# Patient Record
Sex: MALE | Race: WHITE
[De-identification: names, ages, dates, MRNs, and addresses within clinical notes are randomized per-mention and may not be internally consistent; named-entity substitution may affect disease eponyms.]

---

## 2018-07-07 ENCOUNTER — HOSPITAL ENCOUNTER (OUTPATIENT)
Dept: HOSPITAL 53 - M WUC | Age: 28
End: 2018-07-07
Attending: PHYSICIAN ASSISTANT
Payer: COMMERCIAL

## 2018-07-07 DIAGNOSIS — L04.0: Primary | ICD-10-CM

## 2018-07-07 LAB
BASO #: 0.1 10^3/UL (ref 0–0.2)
BASO %: 1.1 % (ref 0–1)
EOS #: 0.1 10^3/UL (ref 0–0.5)
EOSINOPHIL NFR BLD AUTO: 1.6 % (ref 0–3)
HEMATOCRIT: 48.4 % (ref 42–52)
HEMOGLOBIN: 16.5 G/DL (ref 13.5–17.5)
IMMATURE GRANULOCYTE %: 0.8 % (ref 0–3)
LYMPH #: 1.1 10^3/UL (ref 1.5–6.5)
LYMPH %: 17.6 % (ref 24–44)
MEAN CORPUSCULAR HEMOGLOBIN: 29.6 PG (ref 27–33)
MEAN CORPUSCULAR HGB CONC: 34.1 G/DL (ref 32–36.5)
MEAN CORPUSCULAR VOLUME: 86.9 FL (ref 80–96)
MONO #: 0.8 10^3/UL (ref 0–0.8)
MONO %: 12.9 % (ref 0–5)
NEUTROPHILS #: 4.2 10^3/UL (ref 1.8–7.7)
NEUTROPHILS %: 66 % (ref 36–66)
NRBC BLD AUTO-RTO: 0 % (ref 0–0)
PLATELET COUNT, AUTOMATED: 217 10^3/UL (ref 150–450)
RED BLOOD COUNT: 5.57 10^6/UL (ref 4.3–6.1)
RED CELL DISTRIBUTION WIDTH: 12.5 % (ref 11.5–14.5)
WHITE BLOOD COUNT: 6.3 10^3/UL (ref 4–10)

## 2018-07-07 PROCEDURE — 85025 COMPLETE CBC W/AUTO DIFF WBC: CPT

## 2021-07-01 ENCOUNTER — HOSPITAL ENCOUNTER (OUTPATIENT)
Dept: HOSPITAL 53 - M RAD | Age: 31
End: 2021-07-01
Attending: PHYSICAL MEDICINE & REHABILITATION
Payer: OTHER GOVERNMENT

## 2021-07-01 DIAGNOSIS — M48.061: ICD-10-CM

## 2021-07-01 DIAGNOSIS — M47.817: Primary | ICD-10-CM

## 2021-07-01 NOTE — REPVR
PROCEDURE INFORMATION: 

Exam: MR Lumbar Spine Without Contrast 

Exam date and time: 7/1/2021 3:12 PM 

Age: 30 years old 

Clinical indication: Other: Radiculopathy 



TECHNIQUE: 

Imaging protocol: Multiplanar magnetic resonance images of the lumbar spine 

without intravenous contrast. 



COMPARISON: 

No relevant prior studies available. 



FINDINGS: 



Prominent Schmorl's node along the anterior superior endplate of L4 with slight 

adjacent marrow edema. Degenerative disc height loss at L3-L4. No MR evidence 

of acute lumbar spine fracture. Vertebral body heights are maintained. No cord 

compression. No abnormal cord signal. Conus medullaris terminates at the L1 

level. Paravertebral soft tissues are unremarkable. 



L1-L2: No significant canal or foraminal narrowing. 

L2-L3: Slight left lateral disc protrusion causes mild left lateral foraminal 

narrowing. No significant canal narrowing. 

L3-L4: Slight broad-based disc bulge causes mild bilateral foraminal narrowing. 

Slight effacement of the bilateral lateral recess with questionable impingement 

upon the traversing bilateral L4 nerve roots. 

L4-L5: Broad-based disc bulge and facet hypertrophy cause mild right and 

moderate left foraminal narrowing. Impingement upon the exiting left L4 nerve 

root. 

L5-S1: Facet hypertrophy causes mild right foraminal narrowing. No significant 

canal narrowing. 



IMPRESSION: 

Multilevel spondylotic changes of the lumbar spine, including moderate left 

foraminal narrowing at L4-L5 with impingement upon the exiting left L4 nerve 

root, as detailed above. 



Electronically signed by: Vitor Hobson On 07/01/2021  21:51:36 PM

## 2022-08-24 ENCOUNTER — HOSPITAL ENCOUNTER (OUTPATIENT)
Dept: HOSPITAL 53 - M PAIN | Age: 32
End: 2022-08-24
Attending: FAMILY MEDICINE
Payer: OTHER GOVERNMENT

## 2022-08-24 DIAGNOSIS — Z86.59: ICD-10-CM

## 2022-08-24 DIAGNOSIS — M54.10: Primary | ICD-10-CM

## 2022-12-01 ENCOUNTER — HOSPITAL ENCOUNTER (OUTPATIENT)
Dept: HOSPITAL 53 - M PLAIMG | Age: 32
End: 2022-12-01
Attending: FAMILY MEDICINE
Payer: OTHER GOVERNMENT

## 2022-12-01 DIAGNOSIS — M54.10: Primary | ICD-10-CM

## 2022-12-09 ENCOUNTER — HOSPITAL ENCOUNTER (OUTPATIENT)
Dept: HOSPITAL 53 - M PAIN | Age: 32
End: 2022-12-09
Attending: FAMILY MEDICINE
Payer: OTHER GOVERNMENT

## 2022-12-09 DIAGNOSIS — M25.562: ICD-10-CM

## 2022-12-09 DIAGNOSIS — M25.561: ICD-10-CM

## 2022-12-09 DIAGNOSIS — M54.2: ICD-10-CM

## 2022-12-09 DIAGNOSIS — M51.16: Primary | ICD-10-CM

## 2022-12-09 DIAGNOSIS — M19.90: ICD-10-CM

## 2022-12-09 DIAGNOSIS — F41.9: ICD-10-CM

## 2023-03-17 ENCOUNTER — HOSPITAL ENCOUNTER (EMERGENCY)
Dept: HOSPITAL 53 - M ED | Age: 33
LOS: 1 days | Discharge: HOME | End: 2023-03-18
Payer: OTHER GOVERNMENT

## 2023-03-17 VITALS — HEIGHT: 72 IN | WEIGHT: 281.31 LBS | BODY MASS INDEX: 38.1 KG/M2

## 2023-03-17 DIAGNOSIS — N50.819: Primary | ICD-10-CM

## 2023-03-17 LAB — N GONORRHOEA RRNA SPEC QL NAA+PROBE: NEGATIVE

## 2023-03-18 VITALS — DIASTOLIC BLOOD PRESSURE: 84 MMHG | SYSTOLIC BLOOD PRESSURE: 122 MMHG
